# Patient Record
Sex: FEMALE | Race: WHITE | Employment: UNEMPLOYED | URBAN - NONMETROPOLITAN AREA
[De-identification: names, ages, dates, MRNs, and addresses within clinical notes are randomized per-mention and may not be internally consistent; named-entity substitution may affect disease eponyms.]

---

## 2020-10-14 ENCOUNTER — OFFICE VISIT (OUTPATIENT)
Dept: OBGYN CLINIC | Age: 25
End: 2020-10-14
Payer: COMMERCIAL

## 2020-10-14 VITALS
SYSTOLIC BLOOD PRESSURE: 124 MMHG | WEIGHT: 121.6 LBS | HEIGHT: 62 IN | DIASTOLIC BLOOD PRESSURE: 76 MMHG | BODY MASS INDEX: 22.38 KG/M2

## 2020-10-14 PROCEDURE — 99385 PREV VISIT NEW AGE 18-39: CPT | Performed by: NURSE PRACTITIONER

## 2020-10-14 RX ORDER — NORGESTIMATE AND ETHINYL ESTRADIOL 0.25-0.035
KIT ORAL
Qty: 1 PACKET | Refills: 12 | Status: SHIPPED | OUTPATIENT
Start: 2020-10-14 | End: 2021-10-19 | Stop reason: SDUPTHER

## 2020-10-14 RX ORDER — CETIRIZINE HYDROCHLORIDE 10 MG/1
10 TABLET ORAL
COMMUNITY

## 2020-10-14 SDOH — HEALTH STABILITY: MENTAL HEALTH: HOW OFTEN DO YOU HAVE A DRINK CONTAINING ALCOHOL?: MONTHLY OR LESS

## 2020-10-14 NOTE — PROGRESS NOTES
YEARLY PHYSICAL    Date of service: 10/14/2020    Helen Wade  Is a 26 y.o.  female    PT's PCP is: No primary care provider on file. : 1995                                             Subjective:       Patient's last menstrual period was 2020 (exact date). Are your menses regular: yes    OB History    Para Term  AB Living   0 0 0 0 0 0   SAB TAB Ectopic Molar Multiple Live Births   0 0 0 0 0 0        Social History     Tobacco Use   Smoking Status Never Smoker   Smokeless Tobacco Never Used        Social History     Substance and Sexual Activity   Alcohol Use Yes    Frequency: Monthly or less       Family History   Problem Relation Age of Onset    Heart Disease Paternal Grandfather     Hypertension Father     Heart Disease Father        Any family history of breast or ovarian cancer: No    Any family history of blood clots: No      Allergies: No known allergies      Current Outpatient Medications:     cetirizine (ZYRTEC ALLERGY) 10 MG tablet, Take 10 mg by mouth, Disp: , Rfl:     Multiple Vitamin (MULTIVITAMIN PO), Take by mouth, Disp: , Rfl:     norgestimate-ethinyl estradiol (SPRINTEC 28) 0.25-35 MG-MCG per tablet, Take one tablet daily, Disp: 1 packet, Rfl: 12    Social History     Substance and Sexual Activity   Sexual Activity Yes    Partners: Male    Birth control/protection: OCP       Any bleeding or pain with intercourse: No    Last Yearly:  2019    Last pap: 2019, pt reported     Last HPV: n/a    Last Mammogram: n/a    Do you do self breast exams: No    History reviewed. No pertinent past medical history. History reviewed. No pertinent surgical history.     Family History   Problem Relation Age of Onset    Heart Disease Paternal Grandfather     Hypertension Father     Heart Disease Father        Chief Complaint   Patient presents with    Gynecologic Exam     C/O intermittent vaginal Lesions absent and Masses absent                                    Vaginal discharge: no vaginal discharge                            Assessment and Plan          Diagnosis Orders   1. Women's annual routine gynecological examination     2. Dysmenorrhea  norgestimate-ethinyl estradiol (SPRINTEC 28) 0.25-35 MG-MCG per tablet       Patient denies risk factors for use. Reviewed risks/benefits, administration, side effects, bleeding patterns. ACHES reviewed. I have changed Faviola Shaikh's Sprintec 28 to norgestimate-ethinyl estradiol. I am also having her maintain her cetirizine and Multiple Vitamin (MULTIVITAMIN PO). Return in about 1 year (around 10/14/2021) for yearly. She was also counseled on her preventative health maintenance recommendations and follow-up. There are no Patient Instructions on file for this visit.     Jonelle Kilgore,10/14/2020 1:18 PM

## 2020-10-21 ENCOUNTER — TELEPHONE (OUTPATIENT)
Dept: OBGYN CLINIC | Age: 25
End: 2020-10-21

## 2020-10-21 NOTE — TELEPHONE ENCOUNTER
Can you see if patient requested pap records from [facility]? She declines pap at annual due to having it done recently. I have not seen results.  Thanks

## 2021-10-19 ENCOUNTER — OFFICE VISIT (OUTPATIENT)
Dept: OBGYN CLINIC | Age: 26
End: 2021-10-19
Payer: COMMERCIAL

## 2021-10-19 VITALS
WEIGHT: 127.6 LBS | SYSTOLIC BLOOD PRESSURE: 114 MMHG | DIASTOLIC BLOOD PRESSURE: 81 MMHG | BODY MASS INDEX: 23.48 KG/M2 | HEIGHT: 62 IN

## 2021-10-19 DIAGNOSIS — Z01.419 WOMEN'S ANNUAL ROUTINE GYNECOLOGICAL EXAMINATION: Primary | ICD-10-CM

## 2021-10-19 DIAGNOSIS — N94.6 DYSMENORRHEA: ICD-10-CM

## 2021-10-19 PROCEDURE — 99395 PREV VISIT EST AGE 18-39: CPT | Performed by: NURSE PRACTITIONER

## 2021-10-19 RX ORDER — NORGESTIMATE AND ETHINYL ESTRADIOL 0.25-0.035
KIT ORAL
Qty: 1 PACKET | Refills: 12 | Status: SHIPPED | OUTPATIENT
Start: 2021-10-19

## 2021-10-19 NOTE — PROGRESS NOTES
Grandfather     Hypertension Father     Heart Disease Father        Chief Complaint   Patient presents with    Gynecologic Exam     Last pap 10/11/19. Reports lmp was 4 days late and had brown spotting 1 wk after menses. PE:  Vital Signs  Blood pressure 114/81, height 5' 2\" (1.575 m), weight 127 lb 9.6 oz (57.9 kg), last menstrual period 10/02/2021. Estimated body mass index is 23.34 kg/m² as calculated from the following:    Height as of this encounter: 5' 2\" (1.575 m). Weight as of this encounter: 127 lb 9.6 oz (57.9 kg). HPI: Patient presents today for annual exam. Feeling well, voices no concerns. Denies breast/pelvic pain. Negative pap 2019. Reports typically has monthly menses with OCP. States LMP started later and continued with dark brown spotting x1 week. Admits to taking PlanB. Review of Systems   All other systems reviewed and are negative. Physical Exam  Constitutional:       General: She is not in acute distress. Appearance: Normal appearance. She is not ill-appearing. Genitourinary:      Vulva, cervix, uterus, right adnexa and left adnexa normal.      No vaginal discharge. Uterus is not tender. Right adnexa not tender. Left adnexa not tender. HENT:      Head: Normocephalic. Cardiovascular:      Rate and Rhythm: Normal rate and regular rhythm. Pulmonary:      Effort: Pulmonary effort is normal.      Breath sounds: Normal breath sounds. Chest:      Breasts:         Right: No mass, nipple discharge, skin change or tenderness. Left: No mass, nipple discharge, skin change or tenderness. Abdominal:      Palpations: Abdomen is soft. Tenderness: There is no abdominal tenderness. There is no guarding or rebound. Musculoskeletal:         General: Normal range of motion. Neurological:      General: No focal deficit present. Mental Status: She is alert.    Psychiatric:         Mood and Affect: Mood normal.         Behavior: Behavior normal.                          Assessment and Plan          Diagnosis Orders   1. Women's annual routine gynecological examination     2. Dysmenorrhea  norgestimate-ethinyl estradiol (SPRINTEC 28) 0.25-35 MG-MCG per tablet       Offered hcg. Desires to continue with OCP, will continue to monitor menses. I am having Faviola HERBERT Joy maintain her cetirizine, Multiple Vitamin (MULTIVITAMIN PO), and norgestimate-ethinyl estradiol. Return in about 1 year (around 10/19/2022) for yearly. She was also counseled on her preventative health maintenance recommendations and follow-up. There are no Patient Instructions on file for this visit.     AKBAR Jeong NP,10/19/2021 10:15 AM

## 2022-10-20 ENCOUNTER — OFFICE VISIT (OUTPATIENT)
Dept: OBGYN CLINIC | Age: 27
End: 2022-10-20
Payer: COMMERCIAL

## 2022-10-20 ENCOUNTER — HOSPITAL ENCOUNTER (OUTPATIENT)
Age: 27
Setting detail: SPECIMEN
Discharge: HOME | End: 2022-10-20

## 2022-10-20 VITALS
WEIGHT: 137.2 LBS | SYSTOLIC BLOOD PRESSURE: 110 MMHG | DIASTOLIC BLOOD PRESSURE: 70 MMHG | HEIGHT: 62 IN | BODY MASS INDEX: 25.25 KG/M2

## 2022-10-20 DIAGNOSIS — N64.4 MASTODYNIA: ICD-10-CM

## 2022-10-20 DIAGNOSIS — Z30.41 ENCOUNTER FOR SURVEILLANCE OF CONTRACEPTIVE PILLS: ICD-10-CM

## 2022-10-20 DIAGNOSIS — Z12.4 SCREENING FOR CERVICAL CANCER: ICD-10-CM

## 2022-10-20 DIAGNOSIS — Z01.411 ABNORMAL FEMALE PELVIC EXAM: Primary | ICD-10-CM

## 2022-10-20 PROCEDURE — 99395 PREV VISIT EST AGE 18-39: CPT | Performed by: ADVANCED PRACTICE MIDWIFE

## 2022-10-20 ASSESSMENT — ENCOUNTER SYMPTOMS
ABDOMINAL PAIN: 0
GASTROINTESTINAL NEGATIVE: 1
DIARRHEA: 0
SHORTNESS OF BREATH: 0
RESPIRATORY NEGATIVE: 1
CONSTIPATION: 0

## 2022-10-20 NOTE — PROGRESS NOTES
YEARLY PHYSICAL    Date of service: 10/20/2022    Romel Rhodes  Is a 33 y.o.   female    PT's PCP is: No primary care provider on file. : 1995                                             Subjective:       Patient's last menstrual period was 10/04/2022 (approximate). Are your menses regular: yes    OB History    Para Term  AB Living   0 0 0 0 0 0   SAB IAB Ectopic Molar Multiple Live Births   0 0 0 0 0 0        Social History     Tobacco Use   Smoking Status Never   Smokeless Tobacco Never        Social History     Substance and Sexual Activity   Alcohol Use Yes    Comment: rare       Family History   Problem Relation Age of Onset    Heart Disease Paternal Grandfather     Hypertension Father     Heart Disease Father        Any family history of breast or ovarian cancer: No    Any family history of blood clots: No      Allergies: No known allergies      Current Outpatient Medications:     norgestimate-ethinyl estradiol ([id number]) 0.25-35 MG-MCG per tablet, Take one tablet daily, Disp: 1 packet, Rfl: 12    cetirizine (ZYRTEC) 10 MG tablet, Take 10 mg by mouth, Disp: , Rfl:     Multiple Vitamin (MULTIVITAMIN PO), Take by mouth (Patient not taking: Reported on 10/20/2022), Disp: , Rfl:     Social History     Substance and Sexual Activity   Sexual Activity Yes    Partners: Male    Birth control/protection: OCP       Any bleeding or pain with intercourse: No    Last Yearly:      Last pap: Due    Do you do self breast exams: Encouraged    History reviewed. No pertinent past medical history. History reviewed. No pertinent surgical history. Family History   Problem Relation Age of Onset    Heart Disease Paternal Grandfather     Hypertension Father     Heart Disease Father        Chief Complaint   Patient presents with    Annual Exam     Pap due. Pt denies concerns. Desires refill of OCP.         Labs:    No results found for this visit on 10/20/22. HPI:  Annual.  Denies breast/pelvic concerns. Menses regular. Continues OCP for menses control - denies any concerns and would like a refill. Pap is due. Declines Sti screening - in monogamous relationship    Review of Systems   Constitutional: Negative. Negative for chills, fatigue and fever. HENT: Negative. Respiratory: Negative. Negative for shortness of breath. Cardiovascular: Negative. Negative for chest pain. Gastrointestinal: Negative. Negative for abdominal pain, constipation and diarrhea. Genitourinary:  Negative for dysuria, enuresis, frequency, menstrual problem, pelvic pain, urgency and vaginal bleeding. Musculoskeletal: Negative. Neurological: Negative. Negative for dizziness, light-headedness and headaches. Psychiatric/Behavioral: Negative. Objective  Blood pressure 110/70, height 5' 2\" (1.575 m), weight 137 lb 3.2 oz (62.2 kg), last menstrual period 10/04/2022. Physical Exam  Constitutional:       Appearance: Normal appearance. She is normal weight. Genitourinary:      Vulva, bladder and urethral meatus normal.      No vaginal discharge or tenderness. No vaginal prolapse present. Right Adnexa: not tender. Left Adnexa: not tender. Cervical friability present. No cervical motion tenderness or discharge. Uterus is not tender. Pelvic exam was performed with patient in the lithotomy position. Breasts:     Breasts are symmetrical.      Breasts are soft. Right: No mass, nipple discharge, skin change or tenderness. Left: No mass, nipple discharge, skin change or tenderness. HENT:      Head: Normocephalic. Eyes:      Pupils: Pupils are equal, round, and reactive to light. Cardiovascular:      Rate and Rhythm: Normal rate and regular rhythm. Pulses: Normal pulses. Heart sounds: Normal heart sounds. No murmur heard.   Pulmonary:      Effort: Pulmonary effort is normal.      Breath sounds: Normal breath sounds. No wheezing. Abdominal:      Palpations: Abdomen is soft. Tenderness: There is no abdominal tenderness. Musculoskeletal:         General: Normal range of motion. Cervical back: Normal range of motion. No muscular tenderness. Neurological:      Mental Status: She is alert and oriented to person, place, and time. Skin:     General: Skin is warm and dry. Psychiatric:         Behavior: Behavior normal.   Vitals and nursing note reviewed. Chaperone present: Declined. Assessment and Plan          Diagnosis Orders   1. Abnormal female pelvic exam        2. Screening for cervical cancer  PAP SMEAR      3. Encounter for surveillance of contraceptive pills        4. Mastodynia          Repeat Annual every 1 year  Cervical Cytology Evaluation begins at 24years old. If Negative Cytology, Follow-up screening per current guidelines. Mammograms every 1year. If 37 yo and last mammogram was negative. Calcium and Vitamin D dosing reviewed. Birth control and barrier recommendationsdiscussed. STD counseling and prevention reviewed. Routine healthmaintenance per patients PCP. Reviewed OCP MOA, side effects, ACHES        I am having Faviola Hamilton maintain her cetirizine, Multiple Vitamin (MULTIVITAMIN PO), and norgestimate-ethinyl estradiol. Return in about 1 year (around 10/20/2023) for Yearly. She was also counseled on her preventative health maintenance recommendations and follow-up. There are no Patient Instructions on file for this visit.     AKBAR Pierson CNM,10/20/2022 12:21 PM

## 2022-10-26 LAB — CYTOLOGY REPORT: NORMAL

## 2022-11-17 DIAGNOSIS — N94.6 DYSMENORRHEA: ICD-10-CM

## 2022-11-17 RX ORDER — NORGESTIMATE AND ETHINYL ESTRADIOL 0.25-0.035
KIT ORAL
Qty: 1 PACKET | Refills: 12 | Status: SHIPPED | OUTPATIENT
Start: 2022-11-17

## 2022-11-17 NOTE — TELEPHONE ENCOUNTER
This is a [facility] patient that saw Ugo Harris for an annual appointment last month. Patient requested a refill of her Sprintec and the note said that they were continuing her Rx, but no refills given. Ok to refill for a year?

## 2023-11-14 ENCOUNTER — TELEPHONE (OUTPATIENT)
Dept: OBGYN CLINIC | Age: 28
End: 2023-11-14

## 2023-11-14 NOTE — TELEPHONE ENCOUNTER
Patient called to inform us that she moved away and asking to cancel her rx. Advised patient that rx is now a year old and has  anyways. She got a letter in the mail from her insurance company saying they paid for medication that they should not have paid for and wanting her to pay reimburse them. Patient thinking that it was for her last rx that she didn't  from the pharmacy. Informed patient insurance typically not billed until medication picked up. Advised her to check with pharmacy or her insurance company for more info.